# Patient Record
(demographics unavailable — no encounter records)

---

## 2017-02-11 NOTE — EMERGENCY ROOM VISIT NOTE
ED Visit Note


First contact with patient:  13:05


CHIEF COMPLAINT: Foot pain  





HISTORY OF PRESENT ILLNESS: This 35-year-old male patient presents to the 

emergency department ambulatory complaining of swelling and pain in the right 

foot at rest and worse with weight bearing. The patient states that he was 

working outside and dropped a log onto the right foot. The patient rates the 

pain as sharp and 8/10. The patient has taken no medications for relief of the 

pain. The patient is able to walk. No numbness or weakness. No ankle pain. 

There are no lacerations of the foot. The patient is able to move all of their 

toes and their ankle without pain.  No previous fracture to this foot.





REVIEW OF SYSTEMS: GENERAL: A 6 system review of systems was completed with 

positives and pertinent negatives in the HPI.





ALLERGIES: No known drug allergies





MEDICATIONS: No chronic medications





PMH: Shoulder surgery, hernia repair





SOCIAL HISTORY: The patient lives locally with family.  He is a smoker and 

admits to occasional alcohol use.





PHYSICAL EXAM: Vital Signs: Reviewed Nurse's notes, vital signs stable. GENERAL

: This is a 35-year-old male, in no acute distress, but appears in pain, well-

developed, well-nourished. MUSCULOSKELETAL: There is no visual deformity of the 

right foot. There is no erythema or ecchymosis. There is no warmth. There is 

tenderness and swelling over the dorsal aspect of the mid right foot. There is 

no tenderness over the lateral or medial malleolus.  No tenderness of the tib/

fib.  The range of motion of the ankle and toes are full.  There is no 

tenderness over the plantar fascia.  The skin is intact and there are no 

lacerations or puncture wounds. Dorsalis pedis pulse 2+.  Capillary refill less 

than 2 seconds. 





RADIOGRAPHIC FINDINGS:








RIGHT FOOT 3 VIEWS





CLINICAL HISTORY: Crushing injury.





FINDINGS: 3 views of the right foot are obtained. No prior studies are available


for comparison at the time of dictation. The skeletal structures are well


mineralized. No fracture is seen. The joint spaces of the foot are


well-maintained. Dorsal soft tissue edema is noted in the midfoot.





IMPRESSION: Dorsal soft tissue swelling with no radiographic evidence of


fracture.











EMERGENCY DEPARTMENT COURSE: I examined the patient.  He was given 1 tablet of 

Percocet for pain.  An X-ray of the right foot was reviewed by myself and 

radiology and reveals no acute fractures. The patient was placed in a postop 

shoe.  Conservative measures were discussed with the patient.  He will follow-

up with his primary care provider as needed.  He was given a short course of 

Norco for pain.  The [place] prescription drug monitoring program was 

queried and no red flags were identified.  The patient was discharged home in 

good condition.





DIAGNOSIS: Foot contusion


Current/Historical Medications


Scheduled PRN


Hydrocodone/Acetaminophen 5MG/325MG (Norco 5MG/325MG), 1-2 TABLET PO Q4H PRN 

for Pain





Allergies


Coded Allergies:  


     No Known Drug Allergy (Verified  Allergy, Unknown, none, 2/11/17)





Vital Signs











  Date Time  Temp Pulse Resp B/P Pulse Ox O2 Delivery O2 Flow Rate FiO2


 


2/11/17 14:57  77 20 120/75 98   


 


2/11/17 12:55 37.0 87 18 115/69 98 Room Air  











Medications Administered











 Medications


  (Trade)  Dose


 Ordered  Sig/Elaine


 Route  Start Time


 Stop Time Status Last Admin


Dose Admin


 


 Oxycodone/


 Acetaminophen


  (Percocet


 5-325mg Tab)  1 tab  NOW  STAT


 PO  2/11/17 13:09


 2/11/17 13:11 DC 2/11/17 13:16


1 TAB











Departure Information


Impression





 Primary Impression:  


 Contusion of foot





Dispostion


Home / Self-Care





Condition


GOOD





Prescriptions





Hydrocodone/Acetaminophen 5MG/325MG (Norco 5MG/325MG)  Tab


1-2 TABLET PO Q4H Y for Pain, #10 TAB


   For Initial Treatment


   Prov: Nichole Can PA-C         2/11/17





Referrals


No Doctor, Assigned (PCP)





Patient Instructions


My [facility]





Additional Instructions





You have been treated in the Emergency Department for a foot injury.  You have 

received pain medicine in the emergency department which impairs your ability 

to operate a vehicle. It is illegal for you to drive after receiving these 

medicines. 





You have been prescribed Norco to be used for pain control. This is a narcotic 

medication. You cannot drive or consume alcohol while on this medicine. This 

medicine should only be used for pain that cannot be controlled with over-the-

counter pain medicines.





For pain control, you can use the following over-the-counter medicines (if >11 yo):





- Regular strength (325mg/tab) Tylenol (acetaminophen) 2 tabs every 4-6 hours 

as needed. Do not exceed 12 tablets in a 24 hour period. Avoid taking more than 

4 grams (4000 mg) of Tylenol per day. This includes any other sources of 

acetaminophen you may take on a regular basis.





- Regular strength (200 mg/tab) Advil (ibuprofen) 1-2 tabs every 4-6 hours as 

needed. Do not exceed a dose of 3200 mg per day.





If this is a recent injury (<24 hrs), ice can be applied to the area of pain 

for the first 3 days to help decrease pain and inflammation.





Wear the boot as needed for pain/difficulty walking.





Follow-up with your primary care provider for repeat x-ray if pain does not 

improve in 6-7 days.





Return to the Emergency Department if your current symptoms worsen despite 

treatment course outlined above, or if you develop any of the following symptoms

: intractable pain despite aforementioned treatment course or new onset of 

numbness or tingling of the foot.





Problem Qualifiers








 Primary Impression:  


 Contusion of foot


 Encounter type:  initial encounter  Laterality:  right  Qualified Codes:  

S90.31XA - Contusion of right foot, initial encounter

## 2017-02-11 NOTE — DIAGNOSTIC IMAGING REPORT
RIGHT FOOT 3 VIEWS



CLINICAL HISTORY: Crushing injury.



FINDINGS: 3 views of the right foot are obtained. No prior studies are available

for comparison at the time of dictation. The skeletal structures are well

mineralized. No fracture is seen. The joint spaces of the foot are

well-maintained. Dorsal soft tissue edema is noted in the midfoot.



IMPRESSION: Dorsal soft tissue swelling with no radiographic evidence of

fracture.







Electronically signed by:  Price North M.D.

2/11/2017 2:14 PM



Dictated Date/Time:  2/11/2017 2:13 PM

## 2017-02-16 NOTE — DIAGNOSTIC IMAGING REPORT
RIGHT FOOT MIN 3 VIEWS ROUTINE



CLINICAL HISTORY: right foot pain

Right pain



COMPARISON: None.



DISCUSSION: The bones and joint spaces appear intact. There is no evidence of

fracture, dislocation or bony disease. There is no evidence for soft tissue

swelling.



IMPRESSION: Negative study.







Electronically signed by:  Kang Hobbs M.D.

2/16/2017 5:56 PM



Dictated Date/Time:  2/16/2017 5:55 PM

## 2017-02-16 NOTE — EMERGENCY ROOM VISIT NOTE
ED Visit Note


First contact with patient:  17:30


CHIEF COMPLAINT: Foot pain  





HISTORY OF PRESENT ILLNESS: This 35-year-old male patient presents to the 

emergency department ambulatory complaining of swelling and pain in the right 

foot at rest and worse with weight bearing. The patient dropped a log on his 

foot next days ago. The patient rates the pain as sharp and 8/10. The patient 

has no relief of the pain. The patient is able to walk. No numbness or 

weakness. No ankle pain. There are no lacerations of the foot. The patient is 

able to move all of their toes and their ankle without pain.  Patient denies 

previous injury to this foot.  The patient states that the swelling has 

markedly decreased.  The patient had an unremarkable x-ray last week.  He has 

not been staying off his foot and has not been wearing the postop shoe.  He 

states that he has a 10-month-old he has to take care of.  He states that he 

followed up with his family doctor on Monday and they referred him to 

[facility] in [place].  He states that they arrive for the 3 PM 

appointment only to find that the referral had not come through yet.  He states 

that they were not able to get the referral quickly enough and they had to 

schedule him for the end of March.  They contacted the family doctor because 

they were not sure what to do and the family doctor referred them to the 

emergency department.





REVIEW OF SYSTEMS: GENERAL: A 6 system review of systems was completed with 

positives and pertinent negatives in the HPI.





ALLERGIES: No known drug allergies





MEDICATIONS: None





PMH: None





SOCIAL HISTORY: The patient lives locally with family





PHYSICAL EXAM: Vital Signs: Reviewed Nurse's notes, vital signs stable. GENERAL

: This is a 35-year-old male, in no acute distress, but appears in pain, well-

developed, well-nourished. MUSCULOSKELETAL: There is no visual deformity of the 

right foot. There is no erythema minimal ecchymosis. There is no warmth. There 

is tenderness and swelling over the dorsum of the right foot. The range of 

motion of the foot is mildly limited secondary to pain. There is no tenderness 

over the plantar fascia. Dorsi flexion 5/5 and Plantar flexion 5/5. The skin is 

intact and there are no lacerations or puncture wounds. Dorsalis pedis pulse 2+

.  Capillary refill less than 2 seconds. 





EMERGENCY DEPARTMENT COURSE: I examined the patient. An X-ray of the right foot 

was reviewed by myself and radiology and reveals no fracture or dislocation.  

The patient has not been staying off his right foot and has not been using his 

postop shoe.  He does state that the swelling has markedly improved but he 

still has pain.  There is no significant swelling, warmth, redness, and I do 

not suspect compartment syndrome at this time.  The patient has had some 

trouble following up with orthopedics.  I suggested he try going to the walk-in 

clinic at [facility] today.  He was given a low top walking boot.  

He will be given a small prescription for Norco.  The patient was discharged 

home in good condition.











RIGHT FOOT MIN 3 VIEWS ROUTINE





CLINICAL HISTORY: right foot pain


Right pain





COMPARISON: None.





DISCUSSION: The bones and joint spaces appear intact. There is no evidence of


fracture, dislocation or bony disease. There is no evidence for soft tissue


swelling.





IMPRESSION: Negative study.








Current/Historical Medications


Scheduled PRN


Hydrocodone/Acetaminophen 5MG/325MG (Norco 5MG/325MG), 1-2 TABLET PO Q4H PRN 

for Pain


Hydrocodone/Acetaminophen 5MG/325MG (Norco 5MG/325MG), 1-2 TABLET PO Q4H PRN 

for Pain





Allergies


Coded Allergies:  


     No Known Drug Allergy (Verified  Allergy, Unknown, none, 2/16/17)





Vital Signs











  Date Time  Temp Pulse Resp B/P Pulse Ox O2 Delivery O2 Flow Rate FiO2


 


2/16/17 18:31  77 18 137/78 96 Room Air  


 


2/16/17 17:25 37.2 81 20 139/80 95 Room Air  











Departure Information


Impression





 Primary Impression:  


 Foot contusion





Dispostion


Home / Self-Care





Condition


GOOD





Prescriptions





Hydrocodone/Acetaminophen 5MG/325MG (Norco 5MG/325MG)  Tab


1-2 TABLET PO Q4H Y for Pain, #10 TAB


   For Initial Treatment


   Prov: Haydee Campos PA-C         2/16/17





Referrals


Mine Rodrigez M.D. (PCP)








Azeem Olivera D.O.





Patient Instructions


ED Contusion Foot, My [facility]





Additional Instructions





Ibuprofen 600 mg every 6-8 hours or moderate pain


Norco 1 tablet every 6 hours if needed for worse pain.  Do not drink or drive 

while taking Norco and do not take with Tylenol.


Wear the walking boot until seen by orthopedics


Try going to the walking clinic at [facility] today between 5 PM 

and 8 PM


Return with any worsening symptoms





Problem Qualifiers








 Primary Impression:  


 Foot contusion


 Encounter type:  initial encounter  Laterality:  right  Qualified Codes:  

S90.31XA - Contusion of right foot, initial encounter

## 2017-09-14 NOTE — EMERGENCY ROOM VISIT NOTE
History


Report prepared by Mailibvalerie:  Chirag Gordon


Under the Supervision of:  Dr. Tylor Carrillo M.D.


First contact with patient:  17:49


Chief Complaint:  TESTICULAR PAIN


Stated Complaint:  PAIN IN R TESTICLE





History of Present Illness


The patient is a 35 year old male who presents to the Emergency Room with 

complaints of constant right testicular pain that started this morning. He 

rates his pain as a 10/10 in severity. The patient states that he went to have 

a bowel movement this morning and was constipated. He states that he felt a 

strain and noticed a hard lump in his testicle. The patient states that the 

hardened area on his testicle is painful. The patient admits that there is 

intermittent pain when urinating. He denies any burning sensation with 

urination.





   Source of History:  patient


   Onset:  this morning


   Position:  other (right testicle)


   Symptom Intensity:  10/10


   Timing:  constant


   Associated Symptoms:  + urinary symptoms





Review of Systems


See HPI for pertinent positives & negatives. A total of 10 systems reviewed and 

were otherwise negative.





Past Medical & Surgical


Surgical Problems:


(1) H/O hernia repair








Family History





Cancer





Social History


Smoking Status:  Current Every Day Smoker


Alcohol Use:  none


Drug Use:  none


Marital Status:  


Housing Status:  lives with family


Occupation Status:  unemployed





Current/Historical Medications


Scheduled


Doxycycline Monohydrate (Monodox), 100 MG PO BID





Scheduled PRN


Aspirin-Acetaminophen-Caffeine (Excedrin Extra Strength), 2 TABS PO UD PRN for 

Pain





Allergies


Coded Allergies:  


     No Known Drug Allergy (Verified  Allergy, Unknown, none, 2/16/17)





Physical Exam


Vital Signs











  Date Time  Temp Pulse Resp B/P (MAP) Pulse Ox O2 Delivery O2 Flow Rate FiO2


 


9/14/17 19:38  65 18 134/74 98   


 


9/14/17 17:47 36.7 68 18 145/84 97 Room Air  











Physical Exam


GENERAL: Patient is a healthy-appearing well-nourished 35 year old male.


HEAD: Normocephalic atraumatic


EYES: Ocular movements intact pupils equal and react to light


OROPHARYNX mucous membranes are moist no exudates present no erythema or edema 

present


NECK: Supple no nuchal rigidity


CHEST: Good equal expansion


LUNGS: Clear and equal to auscultation


CARDIAC: Normal S1 and S2


ABDOMEN: Soft nontender no guarding


: Exquisitely tender to right testicular area. No drainage or lesions 

present. No evidence of hernia.


BACK: No CVA tenderness


EXTREMITIES: No pain upon palpation normal muscle strength in all groups no 

clubbing cyanosis or edema


NEURO: Patient is following commands and answering questions appropriately. 

Alert and oriented x3 Cranial Nerves 2-12 grossly intact





Medical Decision & Procedures


ER Provider


Diagnostic Interpretation:


Radiology results as stated below per my review and radiologist interpretation:





(TESTICULAR) SCROTUM-CONT





HISTORY: Testicular pain  Pt c/o Rt testicle pain





COMPARISON:  None.





FINDINGS:





Right testis: Maximum dimension 4.1 cm. Normal vascular flow





Left testis:  Maximum dimension 4.6 cm. Normal vascular flow





IMPRESSION:  


Normal testicular ultrasound. 














The above report was generated using voice recognition software.  It may contain


grammatical, syntax or spelling errors.











Electronically signed by:  Kang Hobbs M.D.


9/14/2017 6:55 PM





Dictated Date/Time:  9/14/2017 6:54 PM





Laboratory Results











Test


  9/14/17


17:55


 


Urine Color YELLOW 


 


Urine Appearance CLEAR (CLEAR) 


 


Urine pH 5.5 (4.5-7.5) 


 


Urine Specific Gravity


  1.016


(1.000-1.030)


 


Urine Protein NEG (NEG) 


 


Urine Glucose (UA) NEG (NEG) 


 


Urine Ketones NEG (NEG) 


 


Urine Occult Blood NEG (NEG) 


 


Urine Nitrite NEG (NEG) 


 


Urine Bilirubin NEG (NEG) 


 


Urine Urobilinogen NEG (NEG) 


 


Urine Leukocyte Esterase NEG (NEG) 





Labs reviewed by ED physician.





Medications Administered











 Medications


  (Trade)  Dose


 Ordered  Sig/Elaine


 Route  Start Time


 Stop Time Status Last Admin


Dose Admin


 


 Ibuprofen


  (Motrin Tab)  600 mg  NOW  STAT


 PO  9/14/17 17:53


 9/14/17 17:54 DC 9/14/17 17:59


600 MG


 


 Oxycodone/


 Acetaminophen


  (Percocet


 5-325mg Tab)  2 tab  NOW  ONCE


 PO  9/14/17 18:00


 9/14/17 18:01 DC 9/14/17 18:00


2 TAB


 


 Ceftriaxone Sodium


  (Rocephin Im)  250 mg  NOW  STAT


 IM  9/14/17 19:05


 9/14/17 19:07 DC 9/14/17 19:30


250 MG


 


 Azithromycin


  (Zithromax Tab)  1,000 mg  NOW  STAT


 PO  9/14/17 19:05


 9/14/17 19:07 DC 9/14/17 19:29


1,000 MG


 


 Doxycycline


 Hyclate


  (Vibramycin Cap)  100 mg  NOW  STAT


 PO  9/14/17 19:05


 9/14/17 19:07 DC 9/14/17 19:29


100 MG


 


 Oxycodone/


 Acetaminophen


  (Percocet 5/


 325MG Home Pack)  1 homepa  UD  STAT


 PO  9/14/17 19:05


 9/14/17 19:07 DC 9/14/17 19:29


1 [facility]











ED Course


1750: Past medical records reviewed. The patient was evaluated in room B04B. A 

complete history and physical examination was performed. 





1753: Ordered Ibuprofen 600 mg PO.





1800: Ordered Oxycodone/ Acetaminophen 2 tab PO.





1902: Upon reexamination the patient is doing well. I discussed results and 

treatment plan with the patient. He verbalizes agreement and understanding. The 

patient is ready for discharge.





Medical Decision


The differential diagnosis includes: testicular torsion, hernia, epididymis, 

orchitis, hydrocele, and varicocele








This is a 35-year-old male who presents emergency department complaining of 

right testicular pain.  The patient has a benign examination.  He was sent for 

an ultrasound of his testicles however this was also benign.  Based on these 

findings I'll treat the patient for possible early infection by giving him 

Rocephin azithromycin and doxycycline.  He was also given ibuprofen and 

Percocet in the emergency department.  The patient has no evidence of hernia on 

examination.  Based on these findings I felt that the patient can be safely 

discharged home for follow-up with urology.  I also recommended the patient 

were tight fitting underwear along with ice.  Patient was in agreement with the 

treatment plan.





Medication Reconcilliation


Current Medication List:  was personally reviewed by me





Blood Pressure Screening


Patient's blood pressure:  Elevated blood pressure


Blood pressure disposition:  Referred to PCP





Impression





 Primary Impression:  


 Right testicular pain





Scribe Attestation


The scribe's documentation has been prepared under my direction and personally 

reviewed by me in its entirety. I confirm that the note above accurately 

reflects all work, treatment, procedures, and medical decision making performed 

by me.





Departure Information


Dispostion


Home / Self-Care





Prescriptions





Doxycycline Monohydrate (Monodox) 100 Mg Cap


100 MG PO BID for 10 Days, #20 CAP


   Prov: Tylor Carrillo MD         9/14/17





Forms


HOME CARE DOCUMENTATION FORM,                                                 

               IMPORTANT VISIT INFORMATION, WORK / SCHOOL INSTRUCTIONS





Patient Instructions


ED Testicular Pain [facility], My [facility]





Additional Instructions





Follow up with Dr Liu' office 





You were found to have an elevated blood pressure today (>120 sytolic or >90 

diastolic). Per medicare guidelines, you need to follow up with this blood 

pressure screening with your Primary Care Physician (PCP). For a new PCP call 

[phone].   





You received narcotic or benzodiazepene medication while in the emergency room 

today.  This is an addictive medication that may cause drowziness as well as 

constipation.  Do not drive, operate heavy machinery, or drink alcohol under 

the influence of this medication.  





Take 600 mg Ibuprofen every 6 hours


Take Percocet for breakthrough pain








Culture results are usually available in approx 48 hours








You have been examined and treated today on an emergency basis only. This is 

not a substitute for, or an effort to provide, complete comprehensive medical 

care. It is impossible to recognize and treat all injuries or illnesses in a 

single emergency department visit. It is therefore important that you follow up 

closely with Dr Dennis.  Call as soon as possible for an appointment.  





Thank you for your time and consideration.  I look forward to speaking with you 

again soon.  Please don't hesitate to call us if you have any questions.

## 2017-09-14 NOTE — DIAGNOSTIC IMAGING REPORT
(TESTICULAR) SCROTUM-CONT



HISTORY: Testicular pain  Pt c/o Rt testicle pain



COMPARISON:  None.



FINDINGS:



Right testis: Maximum dimension 4.1 cm. Normal vascular flow



Left testis:  Maximum dimension 4.6 cm. Normal vascular flow



IMPRESSION:  

Normal testicular ultrasound. 









The above report was generated using voice recognition software.  It may contain

grammatical, syntax or spelling errors.







Electronically signed by:  Kang Hobbs M.D.

9/14/2017 6:55 PM



Dictated Date/Time:  9/14/2017 6:54 PM